# Patient Record
Sex: FEMALE | Race: OTHER | Employment: FULL TIME | URBAN - NONMETROPOLITAN AREA
[De-identification: names, ages, dates, MRNs, and addresses within clinical notes are randomized per-mention and may not be internally consistent; named-entity substitution may affect disease eponyms.]

---

## 2019-03-02 ENCOUNTER — HOSPITAL ENCOUNTER (EMERGENCY)
Age: 39
Discharge: HOME | End: 2019-03-02
Attending: FAMILY MEDICINE
Payer: COMMERCIAL

## 2019-03-02 VITALS
HEIGHT: 62 IN | SYSTOLIC BLOOD PRESSURE: 122 MMHG | BODY MASS INDEX: 32.02 KG/M2 | OXYGEN SATURATION: 96 % | DIASTOLIC BLOOD PRESSURE: 59 MMHG | HEART RATE: 88 BPM | RESPIRATION RATE: 14 BRPM | TEMPERATURE: 98.3 F | WEIGHT: 174 LBS

## 2019-03-02 DIAGNOSIS — S01.01XA LACERATION OF SCALP, INITIAL ENCOUNTER: Primary | ICD-10-CM

## 2019-03-02 PROCEDURE — 12001 RPR S/N/AX/GEN/TRNK 2.5CM/<: CPT

## 2019-03-02 PROCEDURE — 90715 TDAP VACCINE 7 YRS/> IM: CPT | Performed by: FAMILY MEDICINE

## 2019-03-02 PROCEDURE — 99282 EMERGENCY DEPT VISIT SF MDM: CPT

## 2019-03-02 PROCEDURE — 90471 IMMUNIZATION ADMIN: CPT | Performed by: FAMILY MEDICINE

## 2019-03-02 PROCEDURE — 6360000002 HC RX W HCPCS: Performed by: FAMILY MEDICINE

## 2019-03-02 PROCEDURE — 2709999900 HC NON-CHARGEABLE SUPPLY

## 2019-03-02 RX ORDER — SERTRALINE HYDROCHLORIDE 100 MG/1
100 TABLET, FILM COATED ORAL DAILY
COMMUNITY

## 2019-03-02 RX ORDER — CYCLOBENZAPRINE HCL 10 MG
10 TABLET ORAL 3 TIMES DAILY PRN
COMMUNITY
End: 2019-12-21 | Stop reason: ALTCHOICE

## 2019-03-02 RX ADMIN — TETANUS TOXOID, REDUCED DIPHTHERIA TOXOID AND ACELLULAR PERTUSSIS VACCINE, ADSORBED 0.5 ML: 5; 2.5; 8; 8; 2.5 SUSPENSION INTRAMUSCULAR at 13:23

## 2019-03-02 SDOH — HEALTH STABILITY: MENTAL HEALTH: HOW OFTEN DO YOU HAVE A DRINK CONTAINING ALCOHOL?: NEVER

## 2019-03-02 ASSESSMENT — ENCOUNTER SYMPTOMS
BACK PAIN: 0
VOICE CHANGE: 0
ABDOMINAL PAIN: 0
SORE THROAT: 0
PHOTOPHOBIA: 0
WHEEZING: 0
EYE PAIN: 0
RHINORRHEA: 0
EYE REDNESS: 0
CHEST TIGHTNESS: 0
COLOR CHANGE: 0
FACIAL SWELLING: 0
STRIDOR: 0
DIARRHEA: 0
VOMITING: 0
NAUSEA: 0
SHORTNESS OF BREATH: 0
COUGH: 0
EYE DISCHARGE: 0

## 2019-03-02 ASSESSMENT — PAIN DESCRIPTION - LOCATION: LOCATION: HEAD

## 2019-03-02 ASSESSMENT — PAIN SCALES - GENERAL: PAINLEVEL_OUTOF10: 5

## 2019-03-12 ENCOUNTER — HOSPITAL ENCOUNTER (OUTPATIENT)
Age: 39
Discharge: HOME | End: 2019-03-12
Payer: COMMERCIAL

## 2019-03-12 VITALS
SYSTOLIC BLOOD PRESSURE: 113 MMHG | HEART RATE: 80 BPM | RESPIRATION RATE: 18 BRPM | DIASTOLIC BLOOD PRESSURE: 79 MMHG | TEMPERATURE: 98.4 F

## 2019-03-12 PROCEDURE — 2709999900 HC NON-CHARGEABLE SUPPLY

## 2019-03-12 PROCEDURE — 99213 OFFICE O/P EST LOW 20 MIN: CPT

## 2019-03-12 NOTE — PROGRESS NOTES
Discharge instructions reviewed with patient and questions answered. Skin warm and dry, color normal for ethnicity. Remains alert and cooperative. Denies further questions or concerns at this time.

## 2019-03-12 NOTE — PROGRESS NOTES
Presents ambulatory for staple removal from a worker's comp injury. Healing wound noted on the upper left mid scalp.

## 2019-07-11 ENCOUNTER — HOSPITAL ENCOUNTER (OUTPATIENT)
Dept: MRI IMAGING | Age: 39
Discharge: HOME | End: 2019-07-11
Payer: COMMERCIAL

## 2019-07-11 DIAGNOSIS — M54.5 LOW BACK PAIN, UNSPECIFIED BACK PAIN LATERALITY, UNSPECIFIED CHRONICITY, WITH SCIATICA PRESENCE UNSPECIFIED: ICD-10-CM

## 2019-07-11 PROCEDURE — 72148 MRI LUMBAR SPINE W/O DYE: CPT

## 2019-12-21 ENCOUNTER — APPOINTMENT (OUTPATIENT)
Dept: CT IMAGING | Age: 39
End: 2019-12-21
Payer: COMMERCIAL

## 2019-12-21 ENCOUNTER — HOSPITAL ENCOUNTER (EMERGENCY)
Age: 39
Discharge: HOME | End: 2019-12-21
Attending: EMERGENCY MEDICINE
Payer: COMMERCIAL

## 2019-12-21 VITALS
WEIGHT: 173 LBS | SYSTOLIC BLOOD PRESSURE: 102 MMHG | OXYGEN SATURATION: 100 % | HEART RATE: 94 BPM | HEIGHT: 62 IN | RESPIRATION RATE: 16 BRPM | TEMPERATURE: 100.2 F | BODY MASS INDEX: 31.83 KG/M2 | DIASTOLIC BLOOD PRESSURE: 69 MMHG

## 2019-12-21 DIAGNOSIS — R10.2 PELVIC PAIN: ICD-10-CM

## 2019-12-21 DIAGNOSIS — N30.00 ACUTE CYSTITIS WITHOUT HEMATURIA: Primary | ICD-10-CM

## 2019-12-21 LAB
ALBUMIN SERPL-MCNC: 2.9 GM/DL (ref 3.4–5)
ALP BLD-CCNC: 103 U/L (ref 46–116)
ALT SERPL-CCNC: 27 U/L (ref 14–63)
AMORPHOUS: ABNORMAL
ANION GAP: 9 MEQ/L (ref 8–16)
ANISOCYTOSIS: ABNORMAL
AST SERPL-CCNC: 13 U/L (ref 15–37)
BACTERIA: ABNORMAL
BASOPHILS # BLD: 0.3 % (ref 0–3)
BILIRUB SERPL-MCNC: 0.2 MG/DL (ref 0.2–1)
BILIRUBIN URINE: ABNORMAL
BLOOD, URINE: ABNORMAL
BUN BLDV-MCNC: 10 MG/DL (ref 7–18)
CASTS UA: ABNORMAL /LPF
CHARACTER, URINE: ABNORMAL
CHLORIDE BLD-SCNC: 103 MEQ/L (ref 98–107)
CO2: 27 MEQ/L (ref 21–32)
COLOR: YELLOW
CREAT SERPL-MCNC: 0.7 MG/DL (ref 0.6–1.3)
CRYSTALS, UA: ABNORMAL
EOSINOPHILS RELATIVE PERCENT: 2.3 % (ref 0–4)
EPITHELIAL CELLS, UA: ABNORMAL /HPF
GFR, ESTIMATED: > 90 ML/MIN/1.73M2
GLUCOSE BLD-MCNC: 111 MG/DL (ref 74–106)
GLUCOSE, URINE: NEGATIVE MG/DL
HCT VFR BLD CALC: 29.8 % (ref 37–47)
HEMOGLOBIN: 8.9 GM/DL (ref 12–16)
HYPOCHROMIA: ABNORMAL
ICTOTEST: NEGATIVE
KETONES, URINE: NEGATIVE
LACTATE: 1.2 MMOL/L (ref 0.9–1.7)
LEUKOCYTE ESTERASE, URINE: ABNORMAL
LYMPHOCYTES # BLD: 10.9 % (ref 15–47)
MCH RBC QN AUTO: 21.4 PG (ref 27–31)
MCHC RBC AUTO-ENTMCNC: 29.9 GM/DL (ref 33–37)
MCV RBC AUTO: 71.6 FL (ref 81–99)
MICROCYTES: ABNORMAL
MONOCYTES: 3.9 % (ref 0–12)
MUCUS: ABNORMAL
NITRITE, URINE: NEGATIVE
PDW BLD-RTO: 17.3 % (ref 11.5–14.5)
PH UA: 7 (ref 5–9)
PLATELET # BLD: 400 THOU/MM3 (ref 130–400)
PLATELET ESTIMATE: ADEQUATE
PMV BLD AUTO: 7.4 FL (ref 7.4–10.4)
POC CALCIUM: 8.4 MG/DL (ref 8.5–10.1)
POTASSIUM SERPL-SCNC: 3.6 MEQ/L (ref 3.5–5.1)
PROTEIN UA: 100 MG/DL
RBC # BLD: 4.16 MILL/MM3 (ref 4.2–5.4)
RBC # BLD: ABNORMAL 10*6/UL
RBC UA: ABNORMAL /HPF
REFLEX TO URINE C & S: ABNORMAL
SCAN OF BLOOD SMEAR: NORMAL
SEGS: 82.6 % (ref 43–75)
SODIUM BLD-SCNC: 139 MEQ/L (ref 136–145)
SPECIFIC GRAVITY UA: 1.02 (ref 1–1.03)
TOTAL PROTEIN: 7.3 GM/DL (ref 6.4–8.2)
UROBILINOGEN, URINE: 2 EU/DL (ref 0–1)
WBC # BLD: 12.4 THOU/MM3 (ref 4.8–10.8)
WBC UA: ABNORMAL /HPF

## 2019-12-21 PROCEDURE — 36415 COLL VENOUS BLD VENIPUNCTURE: CPT

## 2019-12-21 PROCEDURE — 6360000004 HC RX CONTRAST MEDICATION: Performed by: EMERGENCY MEDICINE

## 2019-12-21 PROCEDURE — 85025 COMPLETE CBC W/AUTO DIFF WBC: CPT

## 2019-12-21 PROCEDURE — 83605 ASSAY OF LACTIC ACID: CPT

## 2019-12-21 PROCEDURE — 74177 CT ABD & PELVIS W/CONTRAST: CPT

## 2019-12-21 PROCEDURE — 99284 EMERGENCY DEPT VISIT MOD MDM: CPT

## 2019-12-21 PROCEDURE — 81001 URINALYSIS AUTO W/SCOPE: CPT

## 2019-12-21 PROCEDURE — 96365 THER/PROPH/DIAG IV INF INIT: CPT

## 2019-12-21 PROCEDURE — 6370000000 HC RX 637 (ALT 250 FOR IP): Performed by: EMERGENCY MEDICINE

## 2019-12-21 PROCEDURE — 80053 COMPREHEN METABOLIC PANEL: CPT

## 2019-12-21 PROCEDURE — 6360000002 HC RX W HCPCS: Performed by: EMERGENCY MEDICINE

## 2019-12-21 PROCEDURE — 2709999900 HC NON-CHARGEABLE SUPPLY

## 2019-12-21 PROCEDURE — 87086 URINE CULTURE/COLONY COUNT: CPT

## 2019-12-21 PROCEDURE — 2580000003 HC RX 258: Performed by: EMERGENCY MEDICINE

## 2019-12-21 RX ORDER — TRAMADOL HYDROCHLORIDE 50 MG/1
50 TABLET ORAL EVERY 6 HOURS PRN
COMMUNITY
End: 2021-12-18

## 2019-12-21 RX ORDER — CIPROFLOXACIN 500 MG/1
500 TABLET, FILM COATED ORAL ONCE
Status: COMPLETED | OUTPATIENT
Start: 2019-12-21 | End: 2019-12-21

## 2019-12-21 RX ORDER — 0.9 % SODIUM CHLORIDE 0.9 %
1000 INTRAVENOUS SOLUTION INTRAVENOUS ONCE
Status: COMPLETED | OUTPATIENT
Start: 2019-12-21 | End: 2019-12-21

## 2019-12-21 RX ORDER — OXYCODONE HYDROCHLORIDE AND ACETAMINOPHEN 5; 325 MG/1; MG/1
1 TABLET ORAL EVERY 4 HOURS PRN
COMMUNITY
End: 2021-12-18

## 2019-12-21 RX ORDER — IBUPROFEN 800 MG/1
800 TABLET ORAL EVERY 6 HOURS PRN
COMMUNITY

## 2019-12-21 RX ORDER — CIPROFLOXACIN 500 MG/1
500 TABLET, FILM COATED ORAL 2 TIMES DAILY
Qty: 14 TABLET | Refills: 0 | Status: SHIPPED | OUTPATIENT
Start: 2019-12-21 | End: 2019-12-28

## 2019-12-21 RX ADMIN — SODIUM CHLORIDE 1000 ML: 9 INJECTION, SOLUTION INTRAVENOUS at 18:24

## 2019-12-21 RX ADMIN — CEFTRIAXONE SODIUM 1 G: 1 INJECTION, POWDER, FOR SOLUTION INTRAMUSCULAR; INTRAVENOUS at 20:00

## 2019-12-21 RX ADMIN — SODIUM CHLORIDE 1000 ML: 9 INJECTION, SOLUTION INTRAVENOUS at 20:00

## 2019-12-21 RX ADMIN — IOPAMIDOL 100 ML: 755 INJECTION, SOLUTION INTRAVENOUS at 19:29

## 2019-12-21 RX ADMIN — CIPROFLOXACIN 500 MG: 500 TABLET, FILM COATED ORAL at 20:35

## 2019-12-21 ASSESSMENT — ENCOUNTER SYMPTOMS
DIARRHEA: 0
EYE DISCHARGE: 0
ABDOMINAL PAIN: 1
WHEEZING: 0
BLOOD IN STOOL: 0
SHORTNESS OF BREATH: 0
SORE THROAT: 0
EYE PAIN: 0

## 2019-12-21 ASSESSMENT — PAIN DESCRIPTION - ORIENTATION: ORIENTATION: LOWER

## 2019-12-21 ASSESSMENT — PAIN DESCRIPTION - FREQUENCY: FREQUENCY: INTERMITTENT

## 2019-12-21 ASSESSMENT — PAIN DESCRIPTION - DESCRIPTORS: DESCRIPTORS: CRAMPING

## 2019-12-21 ASSESSMENT — PAIN DESCRIPTION - LOCATION: LOCATION: ABDOMEN

## 2019-12-21 ASSESSMENT — PAIN SCALES - GENERAL: PAINLEVEL_OUTOF10: 6

## 2019-12-24 LAB
ORGANISM: ABNORMAL
URINE CULTURE REFLEX: ABNORMAL

## 2020-08-04 ENCOUNTER — HOSPITAL ENCOUNTER (OUTPATIENT)
Age: 40
Discharge: HOME | End: 2020-08-04
Payer: COMMERCIAL

## 2020-08-04 PROCEDURE — U0003 INFECTIOUS AGENT DETECTION BY NUCLEIC ACID (DNA OR RNA); SEVERE ACUTE RESPIRATORY SYNDROME CORONAVIRUS 2 (SARS-COV-2) (CORONAVIRUS DISEASE [COVID-19]), AMPLIFIED PROBE TECHNIQUE, MAKING USE OF HIGH THROUGHPUT TECHNOLOGIES AS DESCRIBED BY CMS-2020-01-R: HCPCS

## 2020-08-07 LAB — SARS-COV-2: NOT DETECTED

## 2020-11-02 ENCOUNTER — HOSPITAL ENCOUNTER (OUTPATIENT)
Dept: CT IMAGING | Age: 40
Discharge: HOME | End: 2020-11-02
Payer: COMMERCIAL

## 2020-11-02 PROCEDURE — 74176 CT ABD & PELVIS W/O CONTRAST: CPT

## 2021-12-18 ENCOUNTER — APPOINTMENT (OUTPATIENT)
Dept: CT IMAGING | Age: 41
End: 2021-12-18
Payer: COMMERCIAL

## 2021-12-18 ENCOUNTER — HOSPITAL ENCOUNTER (EMERGENCY)
Age: 41
Discharge: HOME | End: 2021-12-18
Attending: EMERGENCY MEDICINE
Payer: COMMERCIAL

## 2021-12-18 VITALS
TEMPERATURE: 97.2 F | SYSTOLIC BLOOD PRESSURE: 126 MMHG | HEIGHT: 62 IN | HEART RATE: 86 BPM | WEIGHT: 181 LBS | DIASTOLIC BLOOD PRESSURE: 69 MMHG | RESPIRATION RATE: 14 BRPM | BODY MASS INDEX: 33.31 KG/M2 | OXYGEN SATURATION: 98 %
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
## 2021-12-18 DIAGNOSIS — S16.1XXA STRAIN OF NECK MUSCLE, INITIAL ENCOUNTER: ICD-10-CM

## 2021-12-18 DIAGNOSIS — S30.1XXA CONTUSION OF ABDOMINAL WALL, INITIAL ENCOUNTER: ICD-10-CM

## 2021-12-18 DIAGNOSIS — S09.90XA CLOSED HEAD INJURY, INITIAL ENCOUNTER: ICD-10-CM

## 2021-12-18 DIAGNOSIS — S20.219A CONTUSION OF CHEST WALL, UNSPECIFIED LATERALITY, INITIAL ENCOUNTER: ICD-10-CM

## 2021-12-18 LAB
ALBUMIN SERPL-MCNC: 4 GM/DL (ref 3.4–5)
ALP BLD-CCNC: 83 U/L (ref 46–116)
ALT SERPL-CCNC: 22 U/L (ref 14–63)
ANION GAP: 11 MEQ/L (ref 8–16)
AST SERPL-CCNC: 16 U/L (ref 15–37)
BASOPHILS # BLD: 0.4 % (ref 0–3)
BILIRUB SERPL-MCNC: 0.4 MG/DL (ref 0.2–1)
BUN BLDV-MCNC: 11 MG/DL (ref 7–18)
CHLORIDE BLD-SCNC: 105 MEQ/L (ref 98–107)
CO2: 28 MEQ/L (ref 21–32)
CREAT SERPL-MCNC: 0.8 MG/DL (ref 0.6–1.3)
EOSINOPHILS RELATIVE PERCENT: 2.7 % (ref 0–4)
GFR, ESTIMATED: 83 ML/MIN/1.73M2
GLUCOSE BLD-MCNC: 101 MG/DL (ref 74–106)
HCT VFR BLD CALC: 44 % (ref 37–47)
HEMOGLOBIN: 14.5 GM/DL (ref 12–16)
LYMPHOCYTES # BLD: 25 % (ref 15–47)
MCH RBC QN AUTO: 30.7 PG (ref 27–31)
MCHC RBC AUTO-ENTMCNC: 33 GM/DL (ref 33–37)
MCV RBC AUTO: 93 FL (ref 81–99)
MONOCYTES: 4.1 % (ref 0–12)
PDW BLD-RTO: 11.8 % (ref 11.5–14.5)
PLATELET # BLD: 290 THOU/MM3 (ref 130–400)
PMV BLD AUTO: 7.7 FL (ref 7.4–10.4)
POC CALCIUM: 8.8 MG/DL (ref 8.5–10.1)
POTASSIUM SERPL-SCNC: 3.7 MEQ/L (ref 3.5–5.1)
RBC # BLD: 4.73 MILL/MM3 (ref 4.2–5.4)
SEGS: 67.8 % (ref 43–75)
SODIUM BLD-SCNC: 144 MEQ/L (ref 136–145)
TOTAL PROTEIN: 8 GM/DL (ref 6.4–8.2)
WBC # BLD: 8.3 THOU/MM3 (ref 4.8–10.8)

## 2021-12-18 PROCEDURE — 74177 CT ABD & PELVIS W/CONTRAST: CPT

## 2021-12-18 PROCEDURE — 71260 CT THORAX DX C+: CPT

## 2021-12-18 PROCEDURE — 6360000004 HC RX CONTRAST MEDICATION: Performed by: EMERGENCY MEDICINE

## 2021-12-18 PROCEDURE — 72125 CT NECK SPINE W/O DYE: CPT

## 2021-12-18 PROCEDURE — 80053 COMPREHEN METABOLIC PANEL: CPT

## 2021-12-18 PROCEDURE — 85025 COMPLETE CBC W/AUTO DIFF WBC: CPT

## 2021-12-18 PROCEDURE — 6370000000 HC RX 637 (ALT 250 FOR IP): Performed by: EMERGENCY MEDICINE

## 2021-12-18 PROCEDURE — 70450 CT HEAD/BRAIN W/O DYE: CPT

## 2021-12-18 PROCEDURE — 99284 EMERGENCY DEPT VISIT MOD MDM: CPT

## 2021-12-18 RX ORDER — IBUPROFEN 800 MG/1
800 TABLET ORAL ONCE
Status: COMPLETED | OUTPATIENT
Start: 2021-12-18 | End: 2021-12-18

## 2021-12-18 RX ORDER — ACETAMINOPHEN 325 MG/1
650 TABLET ORAL EVERY 6 HOURS PRN
COMMUNITY

## 2021-12-18 RX ORDER — HYDROCODONE BITARTRATE AND ACETAMINOPHEN 5; 325 MG/1; MG/1
1 TABLET ORAL ONCE
Status: COMPLETED | OUTPATIENT
Start: 2021-12-18 | End: 2021-12-18

## 2021-12-18 RX ADMIN — IBUPROFEN 800 MG: 800 TABLET, FILM COATED ORAL at 12:48

## 2021-12-18 RX ADMIN — IOPAMIDOL 100 ML: 755 INJECTION, SOLUTION INTRAVENOUS at 13:18

## 2021-12-18 RX ADMIN — HYDROCODONE BITARTRATE AND ACETAMINOPHEN 1 TABLET: 5; 325 TABLET ORAL at 12:48

## 2021-12-18 ASSESSMENT — ENCOUNTER SYMPTOMS
WHEEZING: 0
BLURRED VISION: 0
DIARRHEA: 0
SHORTNESS OF BREATH: 0
NAUSEA: 0
ABDOMINAL PAIN: 1
VOMITING: 0
COUGH: 0

## 2021-12-18 ASSESSMENT — PAIN DESCRIPTION - ORIENTATION: ORIENTATION_3: RIGHT

## 2021-12-18 ASSESSMENT — PAIN DESCRIPTION - LOCATION
LOCATION: HEAD
LOCATION: HEAD
LOCATION_3: RIB CAGE
LOCATION_2: NECK

## 2021-12-18 ASSESSMENT — PAIN SCALES - GENERAL
PAINLEVEL_OUTOF10: 6
PAINLEVEL_OUTOF10: 3

## 2021-12-18 ASSESSMENT — PAIN DESCRIPTION - INTENSITY
RATING_2: 5
RATING_3: 5

## 2021-12-18 NOTE — Clinical Note
Desean Roper was seen and treated in our emergency department on 12/18/2021. She may return to work on 12/20/2021. If you have any questions or concerns, please don't hesitate to call.       Mar Yuan MD
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
## 2021-12-18 NOTE — ED NOTES
Pt alert and oriented. Respirations regular and easy. Discharge instructions reviewed. States understanding. Pt discharged in satisfactory condition.        Ethel Rogers RN  12/18/21 0196

## 2021-12-18 NOTE — Clinical Note
Jayda Valle was seen and treated in our emergency department on 12/18/2021. She may return to work on 12/20/2021. If you have any questions or concerns, please don't hesitate to call.       Shahram Ortega MD
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
## 2021-12-18 NOTE — Clinical Note
Axel Rogers was seen and treated in our emergency department on 12/18/2021. She may return to work on 12/20/2021. If you have any questions or concerns, please don't hesitate to call.       Justine Roach MD

## 2022-01-12 DIAGNOSIS — U07.1 COVID: ICD-10-CM

## 2022-01-12 RX ORDER — SODIUM CHLORIDE 9 MG/ML
INJECTION, SOLUTION INTRAVENOUS CONTINUOUS
OUTPATIENT
Start: 2022-01-12

## 2022-01-12 RX ORDER — HEPARIN SODIUM (PORCINE) LOCK FLUSH IV SOLN 100 UNIT/ML 100 UNIT/ML
500 SOLUTION INTRAVENOUS PRN
OUTPATIENT
Start: 2022-01-12

## 2022-01-12 RX ORDER — ALBUTEROL SULFATE 90 UG/1
4 AEROSOL, METERED RESPIRATORY (INHALATION) PRN
OUTPATIENT
Start: 2022-01-12

## 2022-01-12 RX ORDER — ACETAMINOPHEN 325 MG/1
650 TABLET ORAL
OUTPATIENT
Start: 2022-01-12

## 2022-01-12 RX ORDER — ONDANSETRON 2 MG/ML
8 INJECTION INTRAMUSCULAR; INTRAVENOUS
OUTPATIENT
Start: 2022-01-12

## 2022-01-12 RX ORDER — EPINEPHRINE 1 MG/ML
0.3 INJECTION, SOLUTION, CONCENTRATE INTRAVENOUS PRN
OUTPATIENT
Start: 2022-01-12

## 2022-01-12 RX ORDER — DIPHENHYDRAMINE HYDROCHLORIDE 50 MG/ML
50 INJECTION INTRAMUSCULAR; INTRAVENOUS
OUTPATIENT
Start: 2022-01-12

## 2022-01-12 RX ORDER — SODIUM CHLORIDE 9 MG/ML
25 INJECTION, SOLUTION INTRAVENOUS PRN
OUTPATIENT
Start: 2022-01-12

## 2022-01-12 RX ORDER — SODIUM CHLORIDE 0.9 % (FLUSH) 0.9 %
5-40 SYRINGE (ML) INJECTION PRN
OUTPATIENT
Start: 2022-01-12

## 2022-01-14 ENCOUNTER — HOSPITAL ENCOUNTER (OUTPATIENT)
Dept: GENERAL RADIOLOGY | Age: 42
Discharge: HOME | End: 2022-01-14
Payer: COMMERCIAL

## 2022-01-14 VITALS
DIASTOLIC BLOOD PRESSURE: 62 MMHG | TEMPERATURE: 96.8 F | RESPIRATION RATE: 14 BRPM | HEART RATE: 96 BPM | OXYGEN SATURATION: 99 % | SYSTOLIC BLOOD PRESSURE: 127 MMHG | BODY MASS INDEX: 33.31 KG/M2 | WEIGHT: 181 LBS | HEIGHT: 62 IN

## 2022-01-14 DIAGNOSIS — U07.1 COVID: Primary | ICD-10-CM

## 2022-01-14 PROCEDURE — 96365 THER/PROPH/DIAG IV INF INIT: CPT

## 2022-01-14 PROCEDURE — 6360000002 HC RX W HCPCS: Performed by: INTERNAL MEDICINE

## 2022-01-14 RX ORDER — SODIUM CHLORIDE 9 MG/ML
INJECTION, SOLUTION INTRAVENOUS CONTINUOUS
OUTPATIENT
Start: 2022-01-14

## 2022-01-14 RX ORDER — SODIUM CHLORIDE 9 MG/ML
25 INJECTION, SOLUTION INTRAVENOUS PRN
OUTPATIENT
Start: 2022-01-14

## 2022-01-14 RX ORDER — SODIUM CHLORIDE 0.9 % (FLUSH) 0.9 %
5-40 SYRINGE (ML) INJECTION PRN
OUTPATIENT
Start: 2022-01-14

## 2022-01-14 RX ORDER — ONDANSETRON 2 MG/ML
8 INJECTION INTRAMUSCULAR; INTRAVENOUS
OUTPATIENT
Start: 2022-01-14

## 2022-01-14 RX ORDER — HEPARIN SODIUM (PORCINE) LOCK FLUSH IV SOLN 100 UNIT/ML 100 UNIT/ML
500 SOLUTION INTRAVENOUS PRN
OUTPATIENT
Start: 2022-01-14

## 2022-01-14 RX ORDER — ALBUTEROL SULFATE 90 UG/1
4 AEROSOL, METERED RESPIRATORY (INHALATION) PRN
OUTPATIENT
Start: 2022-01-14

## 2022-01-14 RX ORDER — DIPHENHYDRAMINE HYDROCHLORIDE 50 MG/ML
50 INJECTION INTRAMUSCULAR; INTRAVENOUS
OUTPATIENT
Start: 2022-01-14

## 2022-01-14 RX ORDER — ACETAMINOPHEN 325 MG/1
650 TABLET ORAL
OUTPATIENT
Start: 2022-01-14

## 2022-01-14 RX ADMIN — Medication 500 MG: at 17:18

## 2022-01-14 NOTE — PROGRESS NOTES
No adverse or allergic reaction noted to Sotrovimab. Respirations regular and easy. No distress noted. Will continue to monitor for 1 hour. Pt denies needs. Call light reach.  Instructed to call

## 2022-01-14 NOTE — PROGRESS NOTES
Met: yes   Safety:         (Environmental)   [place] to environment  [facility] ID band is correct and in place/ allergy band as needed   Assess for fall risk   Initiate fall precautions as applicable (fall band, side rails, etc.)   Call light within reach   Bed in low position/ wheels locked    Met: yes   Pain:        Assess pain level and characteristics   Administer analgesics as ordered   Assess effectiveness of pain management and report to MD as needed    Met: yes   Knowledge Deficit:   Assess baseline knowledge   Provide teaching at level of understanding   Provide teaching via preferred learning method   Evaluate teaching effectiveness    Met: yes   Hemodynamic/Respiratory Status:       (Pre and Post Procedure Monitoring)   Assess/Monitor vital signs and LOC   Assess Baseline SpO2 prior to any sedation   Obtain weight/height   Assess vital signs/ LOC until patient meets discharge criteria   Monitor procedure site and notify MD of any issues

## 2022-01-14 NOTE — PROGRESS NOTES
Pt here for outpatient nursing infusion. Respirations regular and easy. Gait steady. Pt alert and oriented X's 3. Taken to exam 4 for sotrovimab infusion. Pt read EUA and is agreeable to infusion.

## 2022-01-15 NOTE — PROGRESS NOTES
No adverse or allergic reaction noted from infusion. Respirations regular and easy. Denies pain. Released in satisfactory condition. Ambulates out. Gait steady.

## 2022-01-26 ENCOUNTER — APPOINTMENT (OUTPATIENT)
Dept: CT IMAGING | Age: 42
End: 2022-01-26
Payer: COMMERCIAL

## 2022-01-26 ENCOUNTER — HOSPITAL ENCOUNTER (EMERGENCY)
Age: 42
Discharge: HOME | End: 2022-01-26
Attending: EMERGENCY MEDICINE
Payer: COMMERCIAL

## 2022-01-26 VITALS
WEIGHT: 181 LBS | DIASTOLIC BLOOD PRESSURE: 85 MMHG | BODY MASS INDEX: 33.31 KG/M2 | SYSTOLIC BLOOD PRESSURE: 120 MMHG | HEART RATE: 94 BPM | TEMPERATURE: 99.6 F | OXYGEN SATURATION: 98 % | HEIGHT: 62 IN | RESPIRATION RATE: 15 BRPM

## 2022-01-26 DIAGNOSIS — J06.9 VIRAL URI WITH COUGH: Primary | ICD-10-CM

## 2022-01-26 DIAGNOSIS — R06.02 SHORTNESS OF BREATH: ICD-10-CM

## 2022-01-26 LAB
ALBUMIN SERPL-MCNC: 3.5 GM/DL (ref 3.4–5)
ALP BLD-CCNC: 92 U/L (ref 46–116)
ALT SERPL-CCNC: 29 U/L (ref 14–63)
ANION GAP: 11 MEQ/L (ref 8–16)
AST SERPL-CCNC: 23 U/L (ref 15–37)
BASOPHILS # BLD: 0.6 % (ref 0–3)
BILIRUB SERPL-MCNC: 0.3 MG/DL (ref 0.2–1)
BUN BLDV-MCNC: 11 MG/DL (ref 7–18)
CHLORIDE BLD-SCNC: 103 MEQ/L (ref 98–107)
CO2: 26 MEQ/L (ref 21–32)
CREAT SERPL-MCNC: 0.7 MG/DL (ref 0.6–1.3)
EKG ATRIAL RATE: 120 BPM
EKG P AXIS: 22 DEGREES
EKG P-R INTERVAL: 122 MS
EKG Q-T INTERVAL: 318 MS
EKG QRS DURATION: 80 MS
EKG QTC CALCULATION (BAZETT): 449 MS
EKG R AXIS: 78 DEGREES
EKG T AXIS: 6 DEGREES
EKG VENTRICULAR RATE: 120 BPM
EOSINOPHILS RELATIVE PERCENT: 1.8 % (ref 0–4)
GFR, ESTIMATED: > 90 ML/MIN/1.73M2
GLUCOSE BLD-MCNC: 114 MG/DL (ref 74–106)
HCT VFR BLD CALC: 41.4 % (ref 37–47)
HEMOGLOBIN: 13.3 GM/DL (ref 12–16)
LYMPHOCYTES # BLD: 14.7 % (ref 15–47)
MAGNESIUM: 1.9 MG/DL (ref 1.8–2.4)
MCH RBC QN AUTO: 30 PG (ref 27–31)
MCHC RBC AUTO-ENTMCNC: 32.2 GM/DL (ref 33–37)
MCV RBC AUTO: 93.2 FL (ref 81–99)
MONOCYTES: 5.8 % (ref 0–12)
PDW BLD-RTO: 12.5 % (ref 11.5–14.5)
PLATELET # BLD: 245 THOU/MM3 (ref 130–400)
PMV BLD AUTO: 7.6 FL (ref 7.4–10.4)
POC CALCIUM: 8.4 MG/DL (ref 8.5–10.1)
POTASSIUM SERPL-SCNC: 3.3 MEQ/L (ref 3.5–5.1)
RBC # BLD: 4.44 MILL/MM3 (ref 4.2–5.4)
SEGS: 77.1 % (ref 43–75)
SODIUM BLD-SCNC: 140 MEQ/L (ref 136–145)
TOTAL PROTEIN: 7.8 GM/DL (ref 6.4–8.2)
TROPONIN I: < 0.017 NG/ML (ref 0.02–0.06)
WBC # BLD: 12.5 THOU/MM3 (ref 4.8–10.8)

## 2022-01-26 PROCEDURE — 93005 ELECTROCARDIOGRAM TRACING: CPT | Performed by: EMERGENCY MEDICINE

## 2022-01-26 PROCEDURE — 99284 EMERGENCY DEPT VISIT MOD MDM: CPT

## 2022-01-26 PROCEDURE — 83735 ASSAY OF MAGNESIUM: CPT

## 2022-01-26 PROCEDURE — 93010 ELECTROCARDIOGRAM REPORT: CPT | Performed by: INTERNAL MEDICINE

## 2022-01-26 PROCEDURE — 85025 COMPLETE CBC W/AUTO DIFF WBC: CPT

## 2022-01-26 PROCEDURE — 6360000004 HC RX CONTRAST MEDICATION: Performed by: EMERGENCY MEDICINE

## 2022-01-26 PROCEDURE — 71275 CT ANGIOGRAPHY CHEST: CPT

## 2022-01-26 PROCEDURE — 84484 ASSAY OF TROPONIN QUANT: CPT

## 2022-01-26 PROCEDURE — 6370000000 HC RX 637 (ALT 250 FOR IP): Performed by: EMERGENCY MEDICINE

## 2022-01-26 PROCEDURE — 80053 COMPREHEN METABOLIC PANEL: CPT

## 2022-01-26 PROCEDURE — 2580000003 HC RX 258: Performed by: EMERGENCY MEDICINE

## 2022-01-26 RX ORDER — PREDNISONE 20 MG/1
40 TABLET ORAL ONCE
Status: COMPLETED | OUTPATIENT
Start: 2022-01-26 | End: 2022-01-26

## 2022-01-26 RX ORDER — 0.9 % SODIUM CHLORIDE 0.9 %
1000 INTRAVENOUS SOLUTION INTRAVENOUS ONCE
Status: COMPLETED | OUTPATIENT
Start: 2022-01-26 | End: 2022-01-26

## 2022-01-26 RX ORDER — PREDNISONE 20 MG/1
40 TABLET ORAL 2 TIMES DAILY
Qty: 20 TABLET | Refills: 0 | Status: SHIPPED | OUTPATIENT
Start: 2022-01-26 | End: 2022-01-31

## 2022-01-26 RX ADMIN — SODIUM CHLORIDE 1000 ML: 9 INJECTION, SOLUTION INTRAVENOUS at 17:16

## 2022-01-26 RX ADMIN — PREDNISONE 40 MG: 20 TABLET ORAL at 18:01

## 2022-01-26 RX ADMIN — IOPAMIDOL 75 ML: 755 INJECTION, SOLUTION INTRAVENOUS at 17:08

## 2022-01-26 ASSESSMENT — ENCOUNTER SYMPTOMS
SORE THROAT: 1
COUGH: 1
EYE PAIN: 0
SHORTNESS OF BREATH: 1
WHEEZING: 0
DIARRHEA: 0
EYE DISCHARGE: 0
BLOOD IN STOOL: 0
ABDOMINAL PAIN: 0

## 2022-01-26 ASSESSMENT — PAIN SCALES - GENERAL
PAINLEVEL_OUTOF10: 2
PAINLEVEL_OUTOF10: 8

## 2022-01-26 ASSESSMENT — PAIN DESCRIPTION - LOCATION
LOCATION: THROAT
LOCATION: THROAT

## 2022-01-26 NOTE — ED NOTES
Pt sent here from Dr. Rome Peña office for fever, sore throat, cough, and rapid heart rate. She was seen at the office earlier today and was swabbed for influenza and strept, which were both negative. Pt states she tested positive for COVID on 1/12/22, she had been better, w/ the exception of sinus congestion and drainage. Last night she started in w/ fever, cough and sore throat. Denies SOB, but voices that she feels like she is breathing faster. Respirations regular and easy. Lungs sound clear t/o. Dry cough noted.       Barbra Cartwright RN  01/26/22 9644

## 2022-01-26 NOTE — ED NOTES
Pt alert and oriented. Respirations regular and easy. Prescriptions sent to pharmacy and pt instructed on. Discharge instructions reviewed. States understanding. Pt discharged in satisfactory condition.        Christen Nurse, RN  01/26/22 8021

## 2022-01-26 NOTE — ED PROVIDER NOTES
[address]  [address] [place] [address] [address]  Phone: [phone]    eMERGENCY dEPARTMENT eNCOUnter           CHIEF COMPLAINT       Chief Complaint   Patient presents with    Fever    Pharyngitis       Nurses Notes reviewed and I agree except as noted in the HPI. HISTORY OF PRESENT ILLNESS    Caryn Estrada is a 42 y.o. female who presented via private vehicle with above-mentioned complaints. Her fever started 2 days ago. Her temperature was 101.2 this afternoon. She took Tylenol. Is complaining of nasal congestion sore throat. She also is complaining of mild nonproductive cough and shortness of breath. She denies chest pain. Patient was diagnosed with Covid infection 2 weeks ago, she was doing well until 3 days ago. She was seen by her family doctor today in the office, her strep and influenza were negative. REVIEW OF SYSTEMS     Review of Systems   Constitutional: Positive for chills, fatigue and fever. HENT: Positive for congestion and sore throat. Eyes: Negative for pain and discharge. Respiratory: Positive for cough and shortness of breath. Negative for wheezing. Cardiovascular: Negative for chest pain and palpitations. Gastrointestinal: Negative for abdominal pain, blood in stool and diarrhea. Genitourinary: Negative for dysuria and hematuria. Musculoskeletal: Negative for neck pain and neck stiffness. Neurological: Negative for seizures, syncope and headaches. Hematological: Negative for adenopathy. Psychiatric/Behavioral: Negative for confusion. PAST MEDICAL HISTORY    has no past medical history on file. SURGICAL HISTORY      has a past surgical history that includes Tubal ligation; Hand surgery (Left); and Hysterectomy.     CURRENT MEDICATIONS       Previous Medications    ACETAMINOPHEN (TYLENOL) 325 MG TABLET    Take 650 mg by mouth every 6 hours as needed for Pain    IBUPROFEN (ADVIL;MOTRIN) 800 MG TABLET    Take 800 mg by mouth every 6 hours as needed for Pain    SERTRALINE (ZOLOFT) 100 MG TABLET    Take 100 mg by mouth daily       ALLERGIES     has No Known Allergies. FAMILY HISTORY     has no family status information on file. family history is not on file. SOCIAL HISTORY      reports that she has been smoking cigarettes. She has been smoking about 0.50 packs per day. She has never used smokeless tobacco. She reports current alcohol use. She reports that she does not use drugs. PHYSICAL EXAM     INITIAL VITALS:  height is 5' 2\" (1.575 m) and weight is 181 lb (82.1 kg). Her oral temperature is 99.6 °F (37.6 °C). Her blood pressure is 120/85 and her pulse is 118. Her respiration is 15 and oxygen saturation is 98%. Physical Exam  Vitals and nursing note reviewed. Constitutional:       General: She is in acute distress. Appearance: She is well-developed. She is ill-appearing. Comments: She looks mildly ill but nontoxic   HENT:      Head: Atraumatic. Eyes:      Conjunctiva/sclera: Conjunctivae normal.      Pupils: Pupils are equal, round, and reactive to light. Neck:      Thyroid: No thyromegaly. Vascular: No JVD. Trachea: No tracheal deviation. Cardiovascular:      Rate and Rhythm: Regular rhythm. Tachycardia present. Heart sounds: No murmur heard. No friction rub. No gallop. Pulmonary:      Effort: Respiratory distress present. Breath sounds: No wheezing. Comments: Her work of breathing is slightly increased, there are scattered bibasilar rales. Abdominal:      General: Bowel sounds are normal.      Palpations: Abdomen is soft. Tenderness: There is no abdominal tenderness. Musculoskeletal:         General: No tenderness. Cervical back: Neck supple. Neurological:      Mental Status: She is alert. DIFFERENTIAL DIAGNOSIS:       DIAGNOSTIC RESULTS     EKG:   Interpreted by Tessa Marrero MD     Rhythm: normal sinus   Rate: Tachycardic  Axis: normal  Ectopy: none  Conduction: normal  ST Segments: no acute change  T Waves: no acute change  Q Waves: none    Clinical Impression: Sinus tachycardia      RADIOLOGY:   Chest CTA was normal,    LABS:   Labs Reviewed   CBC WITH AUTO DIFFERENTIAL - Abnormal; Notable for the following components:       Result Value    WBC 12.5 (*)     MCHC 32.2 (*)     SEGS 77.1 (*)     Lymphocytes 14.7 (*)     All other components within normal limits   COMPREHENSIVE METABOLIC PANEL - Abnormal; Notable for the following components:    Glucose 114 (*)     Potassium 3.3 (*)     POC CALCIUM 8.4 (*)     All other components within normal limits   TROPONIN   MAGNESIUM   GLOMERULAR FILTRATION RATE, ESTIMATED   ANION GAP   Laboratory studies were unremarkable    EMERGENCY DEPARTMENT COURSE:   Vitals:    Vitals:    01/26/22 1629 01/26/22 1726   BP: 139/80 120/85   Pulse: 140 118   Resp: 16 15   Temp: 99.6 °F (37.6 °C)    TempSrc: Oral    SpO2: 98% 98%   Weight: 181 lb (82.1 kg)    Height: 5' 2\" (1.575 m)      She received normal saline IV and prednisone p.o. Reevaluation at 6 PM:  She was resting comfortable without respiratory distress. I discussed the diagnosis and treatment plan with her. FINAL IMPRESSION      1. Viral URI with cough    2. Shortness of breath          DISPOSITION/PLAN   Discharged home in good condition.     PATIENT REFERRED TO:  LUIS Norman - CNP  [address] [facility] [address]  [phone]    In 2 days        DISCHARGE MEDICATIONS:  New Prescriptions    PREDNISONE (DELTASONE) 20 MG TABLET    Take 2 tablets by mouth 2 times daily for 5 days       (Please note that portions of this note were completed with a voice recognition program.  Efforts were made to edit the dictations but occasionally words are mis-transcribed.)    MD Garrett Sharma MD  01/26/22 7928